# Patient Record
Sex: FEMALE | Race: BLACK OR AFRICAN AMERICAN | NOT HISPANIC OR LATINO | Employment: UNEMPLOYED | URBAN - METROPOLITAN AREA
[De-identification: names, ages, dates, MRNs, and addresses within clinical notes are randomized per-mention and may not be internally consistent; named-entity substitution may affect disease eponyms.]

---

## 2024-08-12 ENCOUNTER — OFFICE VISIT (OUTPATIENT)
Dept: PODIATRY | Facility: CLINIC | Age: 69
End: 2024-08-12
Payer: MEDICARE

## 2024-08-12 VITALS
DIASTOLIC BLOOD PRESSURE: 63 MMHG | WEIGHT: 190 LBS | HEART RATE: 74 BPM | SYSTOLIC BLOOD PRESSURE: 120 MMHG | HEIGHT: 62 IN | BODY MASS INDEX: 34.96 KG/M2

## 2024-08-12 DIAGNOSIS — E11.9 TYPE 2 DIABETES MELLITUS WITHOUT COMPLICATION, WITHOUT LONG-TERM CURRENT USE OF INSULIN: Primary | ICD-10-CM

## 2024-08-12 DIAGNOSIS — E11.9 COMPREHENSIVE DIABETIC FOOT EXAMINATION, TYPE 2 DM, ENCOUNTER FOR: ICD-10-CM

## 2024-08-12 DIAGNOSIS — L60.8 ACQUIRED DEFORMITY OF NAIL PLATE: ICD-10-CM

## 2024-08-12 PROCEDURE — 4010F ACE/ARB THERAPY RXD/TAKEN: CPT | Mod: CPTII,S$GLB,, | Performed by: PODIATRIST

## 2024-08-12 PROCEDURE — 3008F BODY MASS INDEX DOCD: CPT | Mod: CPTII,S$GLB,, | Performed by: PODIATRIST

## 2024-08-12 PROCEDURE — 3078F DIAST BP <80 MM HG: CPT | Mod: CPTII,S$GLB,, | Performed by: PODIATRIST

## 2024-08-12 PROCEDURE — 1101F PT FALLS ASSESS-DOCD LE1/YR: CPT | Mod: CPTII,S$GLB,, | Performed by: PODIATRIST

## 2024-08-12 PROCEDURE — 99999 PR PBB SHADOW E&M-NEW PATIENT-LVL IV: CPT | Mod: PBBFAC,,, | Performed by: PODIATRIST

## 2024-08-12 PROCEDURE — 99202 OFFICE O/P NEW SF 15 MIN: CPT | Mod: S$GLB,,, | Performed by: PODIATRIST

## 2024-08-12 PROCEDURE — 1159F MED LIST DOCD IN RCRD: CPT | Mod: CPTII,S$GLB,, | Performed by: PODIATRIST

## 2024-08-12 PROCEDURE — 3288F FALL RISK ASSESSMENT DOCD: CPT | Mod: CPTII,S$GLB,, | Performed by: PODIATRIST

## 2024-08-12 PROCEDURE — 1126F AMNT PAIN NOTED NONE PRSNT: CPT | Mod: CPTII,S$GLB,, | Performed by: PODIATRIST

## 2024-08-12 PROCEDURE — 3074F SYST BP LT 130 MM HG: CPT | Mod: CPTII,S$GLB,, | Performed by: PODIATRIST

## 2024-08-12 PROCEDURE — 1160F RVW MEDS BY RX/DR IN RCRD: CPT | Mod: CPTII,S$GLB,, | Performed by: PODIATRIST

## 2024-08-12 RX ORDER — ROSUVASTATIN CALCIUM 20 MG/1
20 TABLET, COATED ORAL
COMMUNITY

## 2024-08-12 RX ORDER — DICLOFENAC SODIUM 10 MG/G
GEL TOPICAL
COMMUNITY
Start: 2024-07-19

## 2024-08-12 RX ORDER — FLUOXETINE HYDROCHLORIDE 40 MG/1
40 CAPSULE ORAL
COMMUNITY

## 2024-08-12 RX ORDER — CARVEDILOL 25 MG/1
37.5 TABLET ORAL 2 TIMES DAILY
COMMUNITY

## 2024-08-12 RX ORDER — AMLODIPINE BESYLATE 5 MG/1
5 TABLET ORAL
COMMUNITY

## 2024-08-12 RX ORDER — ASPIRIN 81 MG/1
81 TABLET ORAL DAILY
COMMUNITY

## 2024-08-12 RX ORDER — ALPRAZOLAM 0.25 MG/1
0.25 TABLET ORAL NIGHTLY PRN
COMMUNITY

## 2024-08-12 RX ORDER — CLOTRIMAZOLE 1 %
CREAM (GRAM) TOPICAL 2 TIMES DAILY PRN
COMMUNITY

## 2024-08-12 RX ORDER — OLMESARTAN MEDOXOMIL 40 MG/1
40 TABLET ORAL
COMMUNITY

## 2024-08-12 RX ORDER — LIRAGLUTIDE 6 MG/ML
1.8 INJECTION SUBCUTANEOUS
COMMUNITY
Start: 2024-06-04

## 2024-08-14 PROBLEM — E11.9 TYPE 2 DIABETES MELLITUS WITHOUT COMPLICATION, WITHOUT LONG-TERM CURRENT USE OF INSULIN: Status: ACTIVE | Noted: 2024-08-14

## 2024-08-14 PROBLEM — L60.8 ACQUIRED DEFORMITY OF NAIL PLATE: Status: ACTIVE | Noted: 2024-08-14

## 2024-08-14 NOTE — PROGRESS NOTES
Subjective:       Patient ID: Samantha Contreras is a 69 y.o. female.    Chief Complaint: Nail Problem and Toe Pain    Patient presents today for a new patient diabetic evaluation.  Patient relates a history of chronically ingrown toenails she states that she had a permanent procedure done sometime ago but still has a sharp piece of nail that continues to grow in along the side of the right great toe.  Past Medical History:   Diagnosis Date    Diabetes mellitus, type 2     Hyperlipidemia      History reviewed. No pertinent surgical history.  No family history on file.  Social History     Socioeconomic History    Marital status: Single   Tobacco Use    Smoking status: Former     Types: Cigarettes     Passive exposure: Never    Smokeless tobacco: Never   Substance and Sexual Activity    Alcohol use: Not Currently    Drug use: Never    Sexual activity: Not Currently       Current Outpatient Medications   Medication Sig Dispense Refill    ALPRAZolam (XANAX) 0.25 MG tablet Take 0.25 mg by mouth nightly as needed.      amLODIPine (NORVASC) 5 MG tablet Take 5 mg by mouth.      aspirin (ECOTRIN) 81 MG EC tablet Take 81 mg by mouth once daily.      carvediloL (COREG) 25 MG tablet Take 37.5 mg by mouth 2 (two) times daily.      clotrimazole (LOTRIMIN) 1 % cream Apply topically 2 (two) times daily as needed.      FLUoxetine 40 MG capsule Take 40 mg by mouth.      olmesartan (BENICAR) 40 MG tablet Take 40 mg by mouth.      rosuvastatin (CRESTOR) 20 MG tablet Take 20 mg by mouth.      VICTOZA 2-HEBER 0.6 mg/0.1 mL (18 mg/3 mL) PnIj pen 1.8 mg.      VOLTAREN ARTHRITIS PAIN 1 % Gel 1 [place], Topical, QID, apply to Right thumb for pain and swelling   not to exceed 8 grams/day/single joint of upper extremities, # 100 gm, 0 Refill(s), Pharmacy: [facility], Pain of right thumb  Swelling of right thumb, 167.5, cm, 07/19/24 12:35:0...       No current facility-administered medications for this visit.     Review of patient's allergies  "indicates:   Allergen Reactions    Ace inhibitors     Metformin Diarrhea     Other Reaction(s): Weight loss or anorexia    Penicillins      Other Reaction(s): Itching       Review of Systems   Musculoskeletal:  Positive for arthralgias.   Skin:  Positive for color change.   All other systems reviewed and are negative.      Objective:      Vitals:    08/12/24 1457   BP: 120/63   BP Location: Left arm   Patient Position: Sitting   Pulse: 74   Weight: 86.2 kg (190 lb)   Height: 5' 2" (1.575 m)     Physical Exam  Vitals and nursing note reviewed.   Constitutional:       Appearance: Normal appearance.   Cardiovascular:      Pulses:           Dorsalis pedis pulses are 1+ on the right side and 1+ on the left side.        Posterior tibial pulses are 1+ on the right side and 1+ on the left side.   Pulmonary:      Effort: Pulmonary effort is normal.   Musculoskeletal:         General: Tenderness and deformity present.   Feet:      Right foot:      Protective Sensation: 3 sites tested.  3 sites sensed.      Skin integrity: Callus and dry skin present.      Toenail Condition: Right toenails are abnormally thick and ingrown. Fungal disease present.     Left foot:      Protective Sensation: 3 sites tested.  3 sites sensed.      Skin integrity: Dry skin present.      Toenail Condition: Fungal disease present.  Skin:     General: Skin is warm.      Capillary Refill: Capillary refill takes 2 to 3 seconds.   Neurological:      General: No focal deficit present.      Mental Status: She is alert.   Psychiatric:         Mood and Affect: Mood normal.         Behavior: Behavior normal.                    Assessment:       1. Type 2 diabetes mellitus without complication, without long-term current use of insulin    2. Comprehensive diabetic foot examination, type 2 DM, encounter for    3. Acquired deformity of nail plate        Plan:       Patient presents today for a new patient diabetic evaluation.  Patient relates a history of " chronically ingrown toenails she states that she had a permanent procedure done sometime ago but still has a sharp piece of nail that continues to grow in along the side of the right great toe.  A comprehensive new patient diabetic evaluation was performed today patient relates she has been a diabetic times 20 years.  Patient has intact protective sensation when tested with a 5.07 monofilament bilateral.  Patient does have some dry skin however there is no skin breaks no areas of ulceration nor signs of infection noted patient does have fungal involvement noted bilateral I have recommended the use of Vicks vapor rub advising the patient that this needs to be applied twice a day every day and can take 4-6 months to successfully address and show signs of improvement in the fungal infection.  Patient has a spicule formation along the medial border of the patient's right hallux where she had a previous permanent matrixectomy performed however nail has returned this does become problematic and causes the patient some discomfort requiring her to try to trim or remove the extra nail I was able to aggressively trim remove and smooth this area out offering the patient relief.  Diabetic evaluation performed diabetic education provided follow-up will be as needed patient understands any cuts scrapes abrasions areas of irritation redness swelling or drainage to contact us immediately.This note was created using [employer] voice recognition software that occasionally misinterpreted phrases or words.

## 2025-07-30 ENCOUNTER — OFFICE VISIT (OUTPATIENT)
Dept: PODIATRY | Facility: CLINIC | Age: 70
End: 2025-07-30
Payer: MEDICARE

## 2025-07-30 VITALS
BODY MASS INDEX: 34.68 KG/M2 | WEIGHT: 189.63 LBS | DIASTOLIC BLOOD PRESSURE: 67 MMHG | HEART RATE: 74 BPM | SYSTOLIC BLOOD PRESSURE: 155 MMHG

## 2025-07-30 DIAGNOSIS — S99.922S INJURY OF TOENAIL, LEFT, SEQUELA: ICD-10-CM

## 2025-07-30 DIAGNOSIS — L60.0 INGROWN NAIL OF GREAT TOE OF RIGHT FOOT: ICD-10-CM

## 2025-07-30 DIAGNOSIS — E11.9 TYPE 2 DIABETES MELLITUS WITHOUT COMPLICATION, WITHOUT LONG-TERM CURRENT USE OF INSULIN: ICD-10-CM

## 2025-07-30 DIAGNOSIS — L60.1 ONYCHOLYSIS OF TOENAIL: ICD-10-CM

## 2025-07-30 DIAGNOSIS — E11.9 COMPREHENSIVE DIABETIC FOOT EXAMINATION, TYPE 2 DM, ENCOUNTER FOR: Primary | ICD-10-CM

## 2025-07-30 DIAGNOSIS — L60.8 ACQUIRED DYSMORPHIC TOENAIL: ICD-10-CM

## 2025-07-30 PROCEDURE — 1126F AMNT PAIN NOTED NONE PRSNT: CPT | Mod: CPTII,S$GLB,, | Performed by: PODIATRIST

## 2025-07-30 PROCEDURE — 3078F DIAST BP <80 MM HG: CPT | Mod: CPTII,S$GLB,, | Performed by: PODIATRIST

## 2025-07-30 PROCEDURE — 99999 PR PBB SHADOW E&M-EST. PATIENT-LVL IV: CPT | Mod: PBBFAC,,, | Performed by: PODIATRIST

## 2025-07-30 PROCEDURE — 3288F FALL RISK ASSESSMENT DOCD: CPT | Mod: CPTII,S$GLB,, | Performed by: PODIATRIST

## 2025-07-30 PROCEDURE — 4010F ACE/ARB THERAPY RXD/TAKEN: CPT | Mod: CPTII,S$GLB,, | Performed by: PODIATRIST

## 2025-07-30 PROCEDURE — 1101F PT FALLS ASSESS-DOCD LE1/YR: CPT | Mod: CPTII,S$GLB,, | Performed by: PODIATRIST

## 2025-07-30 PROCEDURE — 1159F MED LIST DOCD IN RCRD: CPT | Mod: CPTII,S$GLB,, | Performed by: PODIATRIST

## 2025-07-30 PROCEDURE — 3008F BODY MASS INDEX DOCD: CPT | Mod: CPTII,S$GLB,, | Performed by: PODIATRIST

## 2025-07-30 PROCEDURE — 99214 OFFICE O/P EST MOD 30 MIN: CPT | Mod: S$GLB,,, | Performed by: PODIATRIST

## 2025-07-30 PROCEDURE — 3077F SYST BP >= 140 MM HG: CPT | Mod: CPTII,S$GLB,, | Performed by: PODIATRIST

## 2025-07-30 RX ORDER — CARIPRAZINE 1.5 MG/1
1.5 CAPSULE, GELATIN COATED ORAL EVERY MORNING
COMMUNITY
Start: 2025-06-25

## 2025-07-30 RX ORDER — CALCIUM CITRATE/VITAMIN D3 200MG-6.25
TABLET ORAL DAILY PRN
COMMUNITY
Start: 2025-07-03

## 2025-07-30 RX ORDER — TIRZEPATIDE 5 MG/.5ML
INJECTION, SOLUTION SUBCUTANEOUS
COMMUNITY
Start: 2025-07-24

## 2025-07-30 RX ORDER — CIPROFLOXACIN 250 MG/1
250 TABLET, FILM COATED ORAL EVERY 12 HOURS
COMMUNITY
Start: 2025-07-21

## 2025-07-30 RX ORDER — LINACLOTIDE 72 UG/1
72 CAPSULE, GELATIN COATED ORAL
COMMUNITY
Start: 2025-06-25

## 2025-07-30 NOTE — PROGRESS NOTES
Subjective:       Patient ID: Samantha Contreras is a 70 y.o. female.    Chief Complaint: Ingrown Toenail  CHIEF COMPLAINT:  - Bilateral foot concerns, including recent ingrown toenail procedure and ongoing diabetic foot care.    HPI:  Samantha presents for follow-up after one year.  Presents for annual diabetic foot exam and follow-up regarding concern of infection ingrown nails   She reports her feet have been doing well over the past year and denies any current burning or tingling sensations. She has had diabetes for approximately 15 years, which has been well-controlled. Her blood sugar was 120 this morning.    She underwent an ingrown toenail procedure about a month ago by Dr. Smith. She expresses dissatisfaction with the procedure, reporting inadequate numbing and subsequent pain. She was prescribed medication that caused itching, leading her to seek care from her primary care physician, who prescribed antibiotics.  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 She had ingrown nail surgery years ago but has not had anything done until the recent procedure with Dr. Smith.    She expresses concern about her foot health due to her diabetes, citing concerns about potential complications.    She denies any foot sores or infections. She denies any problems with diabetes control.    - Diabetes: Diagnosed approximately 15 years ago  - Previously worked in a factory wearing steel-toe boots      Past Medical History:   Diagnosis Date    Diabetes mellitus, type 2     Hyperlipidemia      History reviewed. No pertinent surgical history.  No family history on file.  Social History     Socioeconomic History    Marital status: Single    Tobacco Use    Smoking status: Former     Types: Cigarettes     Passive exposure: Never    Smokeless tobacco: Never   Substance and Sexual Activity    Alcohol use: Not Currently    Drug use: Never    Sexual activity: Not Currently       Current Medications[1]  Review of patient's allergies indicates:   Allergen Reactions    Ace inhibitors     Cephalexin Itching    Codeine Itching    Metformin Diarrhea     Other Reaction(s): Weight loss or anorexia    Penicillins      Other Reaction(s): Itching       Review of Systems   Musculoskeletal:  Negative for gait problem.   All other systems reviewed and are negative.      Objective:      Vitals:    07/30/25 0947   BP: (!) 155/67   Pulse: 74   Weight: 86 kg (189 lb 9.5 oz)     Physical Exam  Vitals and nursing note reviewed.   Constitutional:       General: She is not in acute distress.     Appearance: Normal appearance.   Cardiovascular:      Pulses:           Dorsalis pedis pulses are 2+ on the right side and 2+ on the left side.        Posterior tibial pulses are 2+ on the right side and 2+ on the left side.   Musculoskeletal:         General: Tenderness present.   Feet:      Right foot:      Skin integrity: No erythema (Evidence of a procedure with removal of the deep ingrown nail, there is a deficit in this areas medial aspect of the right hallux.  Nail is severely dystrophic, raised, tented, fungal involvement and distally detached without infection).      Left foot:      Skin integrity: No erythema (Nail with recent trauma detached from the nail bed at least 70% distally, evidence of damage to the nail bed, no infection).   Skin:     General: Skin is warm.      Capillary Refill: Capillary refill takes less than 2 seconds.   Neurological:      Mental Status: She is alert.      Comments: Sensation intact in all areas bilateral feet, no paresthesias bilateral feet   Psychiatric:         Thought Content: Thought content normal.                      Assessment:       1.  Comprehensive diabetic foot examination, type 2 DM, encounter for    2. Type 2 diabetes mellitus without complication, without long-term current use of insulin    3. Injury of toenail, left, sequela    4. Onycholysis of toenail - Left Foot    5. Ingrown nail of great toe of right foot    6. Acquired dysmorphic toenail - Right Foot        Plan:           Comprehensive diabetic pedal exam performed  Reviewed good sensation in feet  Had a lengthy discussion regarding neuropathy symptoms to monitor for  Reviewed benefit of controled glucose/diabetes regarding potential foot problems especially healing neuropathy  Reviewed A1c  Reviewed diabetic education  Reviewed appropriate shoes,  especially indoors to protect feet  Reviewed care and maintenance of skin  Currently continue open toed shoes to avoid pressure on the toenails  We discussed severely damaged nail right great toe, evidence of nail avulsion, most likely with application of phenol medial right hallux.  This nail is thick raised tender without sign of infection at this time.  Showed patient how to reduce thickness of the nail, this should be done every 2 weeks.  It is not recommended she apply any medication to it at this time since she had a procedure a few weeks ago but we did discuss topical treatment to help treat damaged fungal nail and try to prevent recurrence of ingrown nail.  We discussed most likely removed us nail spicule this morning, a leftover piece of nail from the previous procedure  Pointed out to patient almost the whole left great toenail is loose due to previous injury.  It is however dry and stable, lot of the detached nail was debrided.  No evidence of infection  Instructed patient to complete any remaining antibiotics as directed by PCP  She is to soak both nails 5 minutes daily warm water and Epson salt for 1 week  No longer than 5 minutes, do not want the areas moist, can apply minimal antibiotic ointment to the left great toenail  only  Showed patient how to use light Coban to keep the nail protected and prevent the remaining portion from being lifted up on the left great toe.  Applied daily and keep the end of the toe open to air out  Advised patient the remaining portion of nail may come off  Contact office immediately with any changes  Patient was in understanding and agreement with treatment plan.  I counseled the patient on their conditions, implications and medical management.  Instructed patient to contact the office with any changes, questions, concerns, worsening of symptoms.   Total face to face time 30 minutes, exam, assessment, treatment, discussion, additional time for review of chart prior to and following appointment and visit documentation, consultation and coordination of care.    Follow up 1 month    This note was generated with the assistance of ambient listening technology. Verbal consent was obtained by the patient and accompanying visitor(s) for the recording of patient appointment to facilitate this note. I attest to having reviewed and edited the generated note for accuracy, though some syntax or spelling errors may persist. Please contact the author of this note for any clarification.         Bobbi Ferguson DPM  Podiatry  [facility] [address]   [place], [place]  [phone]         [1]   Current Outpatient Medications   Medication Sig Dispense Refill    ALPRAZolam (XANAX) 0.25 MG tablet Take 0.25 mg by mouth nightly as needed.      amLODIPine (NORVASC) 5 MG tablet Take 5 mg by mouth.      aspirin (ECOTRIN) 81 MG EC tablet Take 81 mg by mouth once daily.      carvediloL (COREG) 25 MG tablet Take 37.5 mg by mouth 2 (two) times daily.      ciprofloxacin HCl (CIPRO) 250 MG tablet Take 250 mg by mouth every 12 (twelve) hours.      clotrimazole (LOTRIMIN) 1 % cream Apply topically 2 (two) times daily as needed.      FLUoxetine 40 MG capsule Take 40 mg by mouth.      LINZESS 72 mcg Cap capsule Take 72  mcg by mouth.      MOUNJARO 5 mg/0.5 mL PnIj SMARTSI Milligram(s) SUB-Q Once a Week      olmesartan (BENICAR) 40 MG tablet Take 40 mg by mouth.      rosuvastatin (CRESTOR) 20 MG tablet Take 20 mg by mouth.      TRUE METRIX GLUCOSE TEST STRIP Strp daily as needed.      VICTOZA 2-HEBER 0.6 mg/0.1 mL (18 mg/3 mL) PnIj pen 1.8 mg.      VOLTAREN ARTHRITIS PAIN 1 % Gel 1 [place], Topical, QID, apply to Right thumb for pain and swelling   not to exceed 8 grams/day/single joint of upper extremities, # 100 gm, 0 Refill(s), Pharmacy: [facility], Pain of right thumb  Swelling of right thumb, 167.5, cm, 24 12:35:0...      VRAYLAR 1.5 mg Cap Take 1.5 mg by mouth every morning.       No current facility-administered medications for this visit.

## 2025-08-27 ENCOUNTER — OFFICE VISIT (OUTPATIENT)
Dept: PODIATRY | Facility: CLINIC | Age: 70
End: 2025-08-27
Payer: MEDICARE

## 2025-08-27 VITALS
DIASTOLIC BLOOD PRESSURE: 65 MMHG | BODY MASS INDEX: 33.18 KG/M2 | SYSTOLIC BLOOD PRESSURE: 140 MMHG | HEART RATE: 75 BPM | WEIGHT: 180.31 LBS | RESPIRATION RATE: 18 BRPM | HEIGHT: 62 IN

## 2025-08-27 DIAGNOSIS — L60.8 ACQUIRED DYSMORPHIC TOENAIL: ICD-10-CM

## 2025-08-27 DIAGNOSIS — E11.9 TYPE 2 DIABETES MELLITUS WITHOUT COMPLICATION, WITHOUT LONG-TERM CURRENT USE OF INSULIN: ICD-10-CM

## 2025-08-27 DIAGNOSIS — L60.1 ONYCHOLYSIS OF TOENAIL: ICD-10-CM

## 2025-08-27 DIAGNOSIS — L60.9 ABNORMALITY OF NAIL SURFACE: Primary | ICD-10-CM

## 2025-08-27 PROCEDURE — 99213 OFFICE O/P EST LOW 20 MIN: CPT | Mod: S$GLB,,, | Performed by: PODIATRIST

## 2025-08-27 PROCEDURE — 99999 PR PBB SHADOW E&M-EST. PATIENT-LVL IV: CPT | Mod: PBBFAC,,, | Performed by: PODIATRIST

## 2025-08-27 PROCEDURE — 3008F BODY MASS INDEX DOCD: CPT | Mod: CPTII,S$GLB,, | Performed by: PODIATRIST

## 2025-08-27 PROCEDURE — 4010F ACE/ARB THERAPY RXD/TAKEN: CPT | Mod: CPTII,S$GLB,, | Performed by: PODIATRIST

## 2025-08-27 PROCEDURE — 3077F SYST BP >= 140 MM HG: CPT | Mod: CPTII,S$GLB,, | Performed by: PODIATRIST

## 2025-08-27 PROCEDURE — 1159F MED LIST DOCD IN RCRD: CPT | Mod: CPTII,S$GLB,, | Performed by: PODIATRIST

## 2025-08-27 PROCEDURE — 1101F PT FALLS ASSESS-DOCD LE1/YR: CPT | Mod: CPTII,S$GLB,, | Performed by: PODIATRIST

## 2025-08-27 PROCEDURE — 3078F DIAST BP <80 MM HG: CPT | Mod: CPTII,S$GLB,, | Performed by: PODIATRIST

## 2025-08-27 PROCEDURE — 1126F AMNT PAIN NOTED NONE PRSNT: CPT | Mod: CPTII,S$GLB,, | Performed by: PODIATRIST

## 2025-08-27 PROCEDURE — 3288F FALL RISK ASSESSMENT DOCD: CPT | Mod: CPTII,S$GLB,, | Performed by: PODIATRIST

## 2025-08-27 RX ORDER — FLUOXETINE 20 MG/1
CAPSULE ORAL EVERY MORNING
COMMUNITY
Start: 2025-08-02

## 2025-08-27 RX ORDER — PREDNISONE 20 MG/1
20 TABLET ORAL
COMMUNITY
Start: 2025-08-04

## 2025-08-27 RX ORDER — HYDROXYZINE HYDROCHLORIDE 25 MG/1
50 TABLET, FILM COATED ORAL
COMMUNITY
Start: 2025-08-04